# Patient Record
Sex: FEMALE | Race: WHITE
[De-identification: names, ages, dates, MRNs, and addresses within clinical notes are randomized per-mention and may not be internally consistent; named-entity substitution may affect disease eponyms.]

---

## 2018-01-11 ENCOUNTER — HOSPITAL ENCOUNTER (EMERGENCY)
Dept: HOSPITAL 25 - UCCORT | Age: 33
Discharge: HOME | End: 2018-01-11
Payer: COMMERCIAL

## 2018-01-11 VITALS — DIASTOLIC BLOOD PRESSURE: 91 MMHG | SYSTOLIC BLOOD PRESSURE: 136 MMHG

## 2018-01-11 DIAGNOSIS — J01.90: Primary | ICD-10-CM

## 2018-01-11 DIAGNOSIS — R05: ICD-10-CM

## 2018-01-11 DIAGNOSIS — R03.0: ICD-10-CM

## 2018-01-11 DIAGNOSIS — F17.210: ICD-10-CM

## 2018-01-11 DIAGNOSIS — F41.9: ICD-10-CM

## 2018-01-11 PROCEDURE — 99212 OFFICE O/P EST SF 10 MIN: CPT

## 2018-01-11 PROCEDURE — G0463 HOSPITAL OUTPT CLINIC VISIT: HCPCS

## 2018-01-11 NOTE — UC
Respiratory Complaint HPI





- HPI Summary


HPI Summary: 


31 y/o female presents to the urgent care c/o nasal congestion with green 

discharge, productive cough, b/l ear pain for the past 2 weeks.  Symptoms 

started with a common cold.  +PND, with yellowish nasal discharge.  She 

developed sore throat, HA, sinus pain for the past 5 days. She took Tylenol PO 

this morning to alleviates symptoms.  Pt denies fever,  SOB, chest pain 

abdominal pain, N/V/D. Pt has Hx of anxiety. 








- History of Current Complaint


Chief Complaint: UCRespiratory


Stated Complaint: KWASI,ST,COUGH


Time Seen by Provider: 01/11/18 20:52


Hx Obtained From: Patient


Hx Last Menstrual Period: 9/14/16


Onset/Duration: Gradual Onset, Lasting Weeks - 2 weeks, Still Present, Worse 

Since - 5 days


Timing: Constant


Severity Initially: Mild


Severity Currently: Moderate


Pain Intensity: 5


Pain Scale Used: 0-10 Numeric


Character: Cough: Productive


Aggravating Factors: Allergens


Alleviating Factors: Nothing


Associated Signs And Symptoms: Positive: URI, Nasal Congestion, Sinus Discomfort


Related History: Seasonal Allergies





- Risk Factors


Pulmonary Embolism Risk Factors: Negative


Cardiac Risk Factors: Negative


Pseudomonas Risk Factors: Negative


Tuberculosis Risk Factors: Negative





- Allergies/Home Medications


Allergies/Adverse Reactions: 


 Allergies











Allergy/AdvReac Type Severity Reaction Status Date / Time


 


No Known Allergies Allergy   Verified 01/11/18 20:46














PMH/Surg Hx/FS Hx/Imm Hx


Previously Healthy: Yes - Pt deneis PMHX





- Surgical History


Surgical History: None





- Family History


Known Family History: Positive: Unknown - Pt denies knowing FMHX


   Negative: Hypertension





- Social History


Occupation: Employed Full-time


Lives: With Family


Alcohol Use: None


Substance Use Type: None


Smoking Status (MU): Light Every Day Tobacco Smoker


Type: Cigarettes


Amount Used/How Often: 6 cigarettes/day


Length of Time of Smoking/Using Tobacco: 10 years


Household Exposure Type: Cigarettes





- Immunization History


Most Recent Influenza Vaccination: 2017





Review of Systems


Constitutional: Negative


Skin: Negative


Eyes: Negative


ENT: Ear Ache, Nasal Discharge, Sinus Congestion, Sinus Pain/Tenderness


Respiratory: Cough


Cardiovascular: Negative


Gastrointestinal: Negative


Genitourinary: Negative


Motor: Negative


Neurovascular: Negative


Musculoskeletal: Negative


Neurological: Headache


Psychological: Negative


Is Patient Immunocompromised?: No


All Other Systems Reviewed And Are Negative: Yes





Physical Exam


Triage Information Reviewed: Yes


Vital Signs: 


 Initial Vital Signs











Temp  97.7 F   01/11/18 20:46


 


Pulse  112   01/11/18 20:46


 


Resp  16   01/11/18 20:46


 


BP  136/91   01/11/18 20:46


 


Pulse Ox  100   01/11/18 20:46














- Additional Comments


Vitals: reviewed


General: Well developed, well-nourished female patient with NAD.


Head and face: Normocephalic and atraumatic, Positive tenderness over the 

frontal and maxillary sinuses..


Eyes: PERRLA, EOMI x 2. Normal conjunctiva. No eye discharge.


ENT: Ears and TM with normal limits. 


Nose: with yellowish discharge and erythematous mucosa. Pharynx with erythema, 

no exudate. 


Neck: Supple, no JVD, no carotid bruits and no lymphadenopathy.


Lungs: clear, no rales, no rhonchi, no wheezes.


CVS: RRR, S1 and S2 present no murmurs or gallops appreciated.


Abdomen: soft nontender with positive bowel sounds.


Extremities: no edema noted.


Neuro: WNL. 


Skin: warm and dry














UC Diagnostic Evaluation





- Laboratory


O2 Sat by Pulse Oximetry: 100





Respiratory Course/Dx





- Course


Course Of Treatment: 31 y/o female presents to the urgent care c/o nasal 

congestion with green discharge, productive cough, b/l ear pain for the past 2 

weeks.  Symptoms started with a common cold.  +PND, with yellowish nasal 

discharge.  She developed sore throat, HA, sinus pain for the past 5 days. She 

took Tylenol PO this morning to alleviates symptoms.  Pt denies fever,  SOB, 

chest pain abdominal pain, N/V/D. Pt has Hx of anxiety. Hx obtained. Pt with 

acute sinusitis on examination.  Pt with 2 weeks of symptoms getting worse. Pt 

Rx Amoxicillin PO and flonase nasal spray. Tessalon PO for cough. Discharge 

instructions explained to Pt. Advised to Return to the clinic or  PCP if 

symptoms do not improve.Pt's BP is elevated today advised to decrease salt in 

diet, monitor BP and f/u with PCP for further management.  Pt understood and 

agreed with plan of care.





- Differential Dx/Diagnosis


Differential Diagnosis/HQI/PQRI: Asthma, Bronchitis, Influenza, Laryngitis, 

Lower Resp Infection, Sinusitis


Provider Diagnoses: 1-Acute bacterial sinusitis.  2-Cough.  3-Elevated BP w/o 

Hx of HTN





Discharge





- Discharge Plan


Condition: Stable


Disposition: HOME


Prescriptions: 


Amoxicillin/Clavulanate TAB* [Augmentin *] 875 mg PO BID #19 tab


Benzonatate CAP* [Tessalon 100 MG CAP*] 100 mg PO TID #21 cap


Fluticasone NASAL SPRAY 50MCG* [Flonase NASAL SPRAY 50MCG*] 2 spray BOTH NARES 

DAILY #1 btl


Patient Education Materials:  Sinusitis (ED), Low Sodium Diet (ED)


Referrals: 


Shmuel Hansen MD [Primary Care Provider] - 3 Days


Additional Instructions: 


1- Please increase fluid intake and rest. take full course of antibiotic to 

avoid resistance


2-Use Flonase as directed to help drain fluid. Also buy saline drops to clear 

sinuses


3-Take Sudafed or Claritin PO to alleviates sinus congestion


4-Return to the clinic or PCP in 3 days if symptoms do not improve for further 

management and treatment


5-Your BP is elevated today. please decrease salt in your diet, monitor BP and 

if it continues to be elevated please f/u with your PCP for further management

## 2019-02-21 ENCOUNTER — HOSPITAL ENCOUNTER (EMERGENCY)
Dept: HOSPITAL 25 - UCCORT | Age: 34
Discharge: HOME | End: 2019-02-21
Payer: COMMERCIAL

## 2019-02-21 VITALS — DIASTOLIC BLOOD PRESSURE: 84 MMHG | SYSTOLIC BLOOD PRESSURE: 133 MMHG

## 2019-02-21 DIAGNOSIS — J32.9: Primary | ICD-10-CM

## 2019-02-21 DIAGNOSIS — H92.01: ICD-10-CM

## 2019-02-21 DIAGNOSIS — F17.210: ICD-10-CM

## 2019-02-21 DIAGNOSIS — J45.909: ICD-10-CM

## 2019-02-21 PROCEDURE — 99212 OFFICE O/P EST SF 10 MIN: CPT

## 2019-02-21 PROCEDURE — 71046 X-RAY EXAM CHEST 2 VIEWS: CPT

## 2019-02-21 PROCEDURE — G0463 HOSPITAL OUTPT CLINIC VISIT: HCPCS

## 2019-02-21 NOTE — ED
Throat Pain/Nasal Congestion





- HPI Summary


HPI Summary: 





right ear pain for the last several weeks. some bloody discharge from nose, 

some yellowish discharge from nose. decrease in sense of taste for the last 

several weeks. with the associated cough . smokes 6-10 cigarettes per day 





- History of Current Complaint


Chief Complaint: UCRespiratory


Time Seen by Provider: 02/21/19 19:39


Hx Obtained From: Patient


Onset/Duration: Gradual Onset, Lasting Weeks


Severity: Moderate


Associated Signs And Symptoms: Positive: Wheezing, Sinus Discomfort


Cough: Productive


Related History: Smoking





- Epiglottits Risk Factors


Epiglottis Risk Factors: Negative





- Allergies/Home Medications


Allergies/Adverse Reactions: 


 Allergies











Allergy/AdvReac Type Severity Reaction Status Date / Time


 


No Known Allergies Allergy   Verified 02/21/19 19:43














PMH/Surg Hx/FS Hx/Imm Hx


Previously Healthy: Yes


Infectious Disease History: No


Infectious Disease History: 


   Denies: Traveled Outside the [place] in Last 30 Days





- Family History


Known Family History: Positive: None, Unknown - Pt denies knowing FMHX


   Negative: Hypertension





- Social History


Alcohol Use: None


Substance Use Type: Reports: None


Smoking Status (MU): Light Every Day Tobacco Smoker


Type: Cigarettes


Amount Used/How Often: 6 cigarettes/day


Length of Time of Smoking/Using Tobacco: 10 years





Review of Systems


Constitutional: Negative


Eyes: Negative


Positive: Ear Ache, Nasal Discharge


Cardiovascular: Negative


Positive: Cough, Other - some wheezing


Gastrointestinal: Negative


Genitourinary: Negative


All Other Systems Reviewed And Are Negative: Yes





Physical Exam


Triage Information Reviewed: Yes


Vital Signs On Initial Exam: 


 Initial Vitals











Temp Pulse Resp BP Pulse Ox


 


 36.3 C   117   16   133/84   100 


 


 02/21/19 19:44  02/21/19 19:44  02/21/19 19:44  02/21/19 19:44  02/21/19 19:44











Vital Signs Reviewed: Yes


Appearance: Positive: Well-Appearing


Skin: Positive: Warm, Dry


Head/Face: Positive: Normal Head/Face Inspection


Eyes: Positive: Normal


ENT: Positive: Normal ENT inspection


Neck: Positive: Supple


Respiratory/Lung Sounds: Positive: Clear to Auscultation


Cardiovascular: Positive: Normal


Abdomen Description: Positive: Nontender


Bowel Sounds: Positive: Present





Diagnostics





- Vital Signs


 Vital Signs











  Temp Pulse Resp BP Pulse Ox


 


 02/21/19 19:44  36.3 C  117  16  133/84  100














- Laboratory


Lab Statement: Any lab studies that have been ordered have been reviewed, and 

results considered in the medical decision making process.





EENT Course/Dx





- Diagnoses


Provider Diagnoses: 


 Sinusitis, Reactive airway disease








Discharge





- Sign-Out/Discharge


Documenting (check all that apply): Patient Departure


All imaging exams completed and their final reports reviewed: Yes





- Discharge Plan


Condition: Fair


Disposition: HOME


Prescriptions: 


Albuterol HFA INHALER* [Ventolin HFA Inhaler*] 2 puff INH Q6H PRN #1 mdi


 PRN Reason: Cough


Amoxicillin PO (*) [Amoxicillin 875 MG (*)] 875 mg PO BID #14 tab


Patient Education Materials:  Sinusitis (ED), Reactive Airways Disease (ED)


Referrals: 


Shmuel Hansen MD [Primary Care Provider] - 





- Billing Disposition and Condition


Condition: FAIR


Disposition: Home

## 2019-02-22 NOTE — UC
- Progress Note


Progress Note: 





Patient Name:         JEIMY JOHNSON                                           

                        Medical Record#: [id number]


Ordering Physician: Acosta Lane MD                                          

                        Acct.#: [id number]


:     1985         Age: 33   Sex: F                                   

                        Location: URGENT [facility]


Exam Date: 19                                                       

                        ADM Status: DEP ER


Order Information:                         CHEST PA & LAT 2 VWS


Accession Number:                          [id number]


CPT:                                       [id number]


HISTORY: cough , dyspnea





COMPARISONS: None





VIEWS: 4: Frontal dual-energy and lateral views of the chest.





FINDINGS:


CARDIOMEDIASTINAL SILHOUETTE: The cardiomediastinal silhouette is normal.


MARLEY: The marley are normal.


PLEURA: The costophrenic angles are sharp. No pleural abnormalities are noted.


LUNG PARENCHYMA: There is hyperinflation with flattening of the diaphragm and 

expansion of


the AP diameter of the chest.


ABDOMEN: The upper abdomen is clear. There is no subphrenic gas.


BONES AND SOFT TISSUES: No bone or soft tissue abnormalities are noted.


OTHER: None.





IMPRESSION:


HYPERINFLATION, CONSISTENT WITH COPD. NO ACTIVE CARDIOPULMONARY DISEASE.





R0








Preliminary Imaging Read 


R0                                                                         





____________________________________________________________


<Electronically signed by Damon Licea MD in OV>  19


Dictated By: Damon Licea MD


Dictated Date/Time: 1943


Transcribed Date/Time: 1942


Copy to:











CC:Shmuel Hansen MD; Acosta Lane MD


Imaging - [facility]                                 Imaging - [facility] 


[address]                                       [address] [place] [address]


ph ([phone])                                     ph ([phone])        

                                        ph ([phone]) 





This report is only to be considered final once signed by the Provider(s) as 

displayed in the "<Electronically Signed by >" field (s). Absence of a 


signature indicates the report is in a draft status and still needs to be 

finalized. In the event this document was created by someone other than the 


signing Provider, the individual initiating the document will be listed in the 

"Entered by:" or "Dictated by:" fields.


                                                                 1 of 2








Course/Dx





- Diagnoses


Provider Diagnoses: 


 Sinusitis, Reactive airway disease








Discharge





- Sign-Out/Discharge


Documenting (check all that apply): Post-Discharge Follow Up


All imaging exams completed and their final reports reviewed: Yes





- Discharge Plan


Condition: Fair


Disposition: HOME


Prescriptions: 


Albuterol HFA INHALER* [Ventolin HFA Inhaler*] 2 puff INH Q6H PRN #1 mdi


 PRN Reason: Cough


Amoxicillin PO (*) [Amoxicillin 875 MG (*)] 875 mg PO BID #14 tab


Fluconazole 100 MG TAB* [Diflucan 100 MG TAB*] 100 mg PO ONCE 1 Days #1 tab


Patient Education Materials:  Sinusitis (ED), Reactive Airways Disease (ED)


Referrals: 


Shmuel Hansen MD [Primary Care Provider] - 





- Billing Disposition and Condition


Condition: FAIR


Disposition: Home